# Patient Record
Sex: MALE | Race: WHITE | Employment: OTHER | URBAN - NONMETROPOLITAN AREA
[De-identification: names, ages, dates, MRNs, and addresses within clinical notes are randomized per-mention and may not be internally consistent; named-entity substitution may affect disease eponyms.]

---

## 2012-02-01 LAB — DIABETIC RETINOPATHY: POSITIVE

## 2016-10-25 LAB
CREATININE, URINE: 180.9
MICROALBUMIN/CREAT 24H UR: 10.1 MG/G{CREAT}
MICROALBUMIN/CREAT UR-RTO: 5.6

## 2018-02-23 LAB
ALBUMIN SERPL-MCNC: NORMAL G/DL
ALP BLD-CCNC: NORMAL U/L
ALT SERPL-CCNC: NORMAL U/L
ANION GAP SERPL CALCULATED.3IONS-SCNC: NORMAL MMOL/L
AST SERPL-CCNC: NORMAL U/L
AVERAGE GLUCOSE: 143
BILIRUB SERPL-MCNC: NORMAL MG/DL
BUN BLDV-MCNC: NORMAL MG/DL
CALCIUM SERPL-MCNC: NORMAL MG/DL
CHLORIDE BLD-SCNC: NORMAL MMOL/L
CO2: NORMAL
CREAT SERPL-MCNC: NORMAL MG/DL
GFR CALCULATED: NORMAL
GLUCOSE BLD-MCNC: NORMAL MG/DL
HBA1C MFR BLD: 6.6 %
POTASSIUM SERPL-SCNC: NORMAL MMOL/L
SODIUM BLD-SCNC: NORMAL MMOL/L
TOTAL PROTEIN: NORMAL

## 2019-08-07 RX ORDER — METOPROLOL TARTRATE 50 MG/1
TABLET, FILM COATED ORAL
Qty: 60 TABLET | Refills: 0 | Status: SHIPPED | OUTPATIENT
Start: 2019-08-07

## 2020-01-28 RX ORDER — FINASTERIDE 5 MG/1
5 TABLET, FILM COATED ORAL DAILY
COMMUNITY
Start: 2018-09-18

## 2020-01-28 RX ORDER — SIMVASTATIN 40 MG
40 TABLET ORAL DAILY
COMMUNITY
Start: 2017-09-28

## 2020-01-28 RX ORDER — TAMSULOSIN HYDROCHLORIDE 0.4 MG/1
0.8 CAPSULE ORAL NIGHTLY
COMMUNITY
Start: 2016-08-19

## 2020-06-29 NOTE — TELEPHONE ENCOUNTER
I spoke w/ Ivan (pharmacist) she was able to take a verbal. She said that I do not need to fax a paper copy.  If we need to fax an order in the future we can send it to [phone]

## 2020-09-16 ENCOUNTER — TELEPHONE (OUTPATIENT)
Dept: FAMILY MEDICINE CLINIC | Age: 75
End: 2020-09-16

## 2020-09-16 NOTE — TELEPHONE ENCOUNTER
Last Appointment:  Visit date not found  No future appointments. See telephone note for additional information.

## 2020-09-16 NOTE — TELEPHONE ENCOUNTER
[facility] sent a refill request on Eliquis 5mg tab. Pt has not seen Dr Allan Pineda since 11/27/2018. All of his scheduled appointments for the past year have been cancelled 7/20/20. 7/15/20, 3/9/20, 1/27/20, 9/11/19. Previous phone message states that pt is still following w/ his specialist and he will be on the medication for the rest iof his life. I called pt to schedule an appt but had to leave a message. Are you able to refill the medication once an appointment has been made or no because it has been all most 2 years since he was seen?

## 2020-09-16 NOTE — TELEPHONE ENCOUNTER
Spoke with pt today and he recently lost his wife in June and has since moved to [place]. He is trying to get established with a new provider there but it has been difficult because of COVID. He is hoping you will send the Eliquis. I did send a refill encounter. Pt wanted to thank you and Lui Deng of the terrific job and care you provided him with.

## 2020-12-14 ENCOUNTER — TELEPHONE (OUTPATIENT)
Dept: FAMILY MEDICINE CLINIC | Age: 75
End: 2020-12-14